# Patient Record
Sex: MALE | Race: WHITE | Employment: FULL TIME | URBAN - METROPOLITAN AREA
[De-identification: names, ages, dates, MRNs, and addresses within clinical notes are randomized per-mention and may not be internally consistent; named-entity substitution may affect disease eponyms.]

---

## 2017-05-16 ENCOUNTER — OP HISTORICAL/CONVERTED ENCOUNTER (OUTPATIENT)
Dept: OTHER | Age: 37
End: 2017-05-16

## 2018-03-02 ENCOUNTER — OP HISTORICAL/CONVERTED ENCOUNTER (OUTPATIENT)
Dept: OTHER | Age: 38
End: 2018-03-02

## 2018-04-19 ENCOUNTER — OP HISTORICAL/CONVERTED ENCOUNTER (OUTPATIENT)
Dept: OTHER | Age: 38
End: 2018-04-19

## 2019-11-22 ENCOUNTER — TELEPHONE (OUTPATIENT)
Dept: RHEUMATOLOGY | Age: 39
End: 2019-11-22

## 2019-11-22 NOTE — TELEPHONE ENCOUNTER
----- Message from Esther Gan sent at 11/22/2019 11:49 AM EST -----  Regarding: Lupe Mcgraw MD/Telephone  General Message/Vendor Calls    Caller's first and last name:  Aryan Bell     Reason for call: New Pt informed that he is currently suffering from strep throat and bronchitis, because of his condition he questioned if it will be wise to come to his upcoming appt today.        Callback required yes/no and why: Yes       Best contact number(s): [phone]      Details to clarify the request: N/A

## 2019-11-22 NOTE — TELEPHONE ENCOUNTER
Pt informed he should re-scheduled his appt due to strep throat. Pt states he was told to come in to his appt since the call center could not reach the office. Pt informed that we will call him back today with date and time of new appt.

## 2020-02-19 ENCOUNTER — HOSPITAL ENCOUNTER (OUTPATIENT)
Dept: GENERAL RADIOLOGY | Age: 40
Discharge: HOME | End: 2020-02-19
Payer: COMMERCIAL

## 2020-02-19 ENCOUNTER — OFFICE VISIT (OUTPATIENT)
Dept: RHEUMATOLOGY | Age: 40
End: 2020-02-19

## 2020-02-19 VITALS
HEIGHT: 76 IN | DIASTOLIC BLOOD PRESSURE: 84 MMHG | TEMPERATURE: 99.3 F | RESPIRATION RATE: 20 BRPM | BODY MASS INDEX: 38.36 KG/M2 | OXYGEN SATURATION: 97 % | WEIGHT: 315 LBS | HEART RATE: 81 BPM | SYSTOLIC BLOOD PRESSURE: 135 MMHG

## 2020-02-19 DIAGNOSIS — E66.01 SEVERE OBESITY (HCC): ICD-10-CM

## 2020-02-19 DIAGNOSIS — M70.52 PATELLAR BURSITIS OF LEFT KNEE: ICD-10-CM

## 2020-02-19 DIAGNOSIS — M17.0 PRIMARY OSTEOARTHRITIS OF BOTH KNEES: Primary | ICD-10-CM

## 2020-02-19 DIAGNOSIS — M17.0 PRIMARY OSTEOARTHRITIS OF BOTH KNEES: ICD-10-CM

## 2020-02-19 DIAGNOSIS — M10.9 GOUT, UNSPECIFIED CAUSE, UNSPECIFIED CHRONICITY, UNSPECIFIED SITE: ICD-10-CM

## 2020-02-19 PROCEDURE — 73562 X-RAY EXAM OF KNEE 3: CPT

## 2020-02-19 RX ORDER — INDOMETHACIN 50 MG/1
CAPSULE ORAL
COMMUNITY
Start: 2020-01-22

## 2020-02-19 RX ORDER — NAPROXEN 500 MG/1
500 TABLET ORAL 2 TIMES DAILY WITH MEALS
Qty: 60 TAB | Refills: 6 | Status: SHIPPED | OUTPATIENT
Start: 2020-02-19 | End: 2020-03-20

## 2020-02-19 RX ORDER — ALLOPURINOL 100 MG/1
TABLET ORAL
COMMUNITY
Start: 2020-01-22 | End: 2020-02-19 | Stop reason: SDUPTHER

## 2020-02-19 RX ORDER — ALLOPURINOL 100 MG/1
200 TABLET ORAL DAILY
Qty: 180 TAB | Refills: 3 | Status: SHIPPED | OUTPATIENT
Start: 2020-02-19 | End: 2020-04-30 | Stop reason: SDUPTHER

## 2020-02-19 NOTE — PATIENT INSTRUCTIONS
Please fill out your [address] you will receive after your visit in the mail or via [address]!

## 2020-02-19 NOTE — PROGRESS NOTES
REASON FOR VISIT    This is the initial evaluation for Mr. Yanni Hsieh a 37 y.o.  male for question of gout. The patient is referred to the [facility] at the request of Dr. Trinity Win. HISTORY OF PRESENT ILLNESS     Previous medical records reviewed and summarized: yes    MHAQ:0.375  Pain scale: 5/10    This is a 37 y.o. male with hx of gout on allopurinol 200 mg oral daily. The patient notes he has gout in toes, ankles, feet, knee off and on for about 10 years. He had an attack in the left knee and left knee has been painful for 2 months. It hurt to bend down, stretch, stand up. It has improved over the past 3-4 days. He thought it was gout all along. He saw a new PCP on Monday and he mentioned psoriatic arthritis since he has psoriasis. The patient notes that his big toe was red, painful, swollen. After that he had multiple episodes which were similar in the big toe. He was getting the attacks several times a year but last year he had 3-5 times. Then they happened in other joints too. He took cherry juice and took tylenol initially. Then eventually he was given colchicine. He would take colchicine when he got the attack. He would take enough to get diarrhea then he would stop. With the colchicine the attacks improved faster. Otherwise they would last for a week to 3 weeks. He had an ankle attack last year and they gave him IM steroid injection. Allopurinol was started 2 years ago but he stopped taking it. He was on 100 mg oral daily. He took it for 1 month. Then he was put on uloric which he took for 1 month but stopped it due to insurance. For the past 3-4 months he has been taking 200 mg oral daily of allopurinol consistently. For the past 2 month months he has left knee pain with running, walking. IT has swollen and it feels gout like. In the beginning it was swollen and red. The swelling was there over the past 2 months.     He has woke up in the past 3-4 months and he has woken up with pain in ankle or foot and he takes indomethacin as soon as he felt the pain. Sometimes the symptoms would go away and sometimes they would persist and eventually improve. No hx of arthrocentesis. He has had lower back pain in the past.    + psoriasis. He never drinks alcohol.  + use of sugary drinks. REVIEW OF SYSTEMS    A 15 point review of systems was performed and summarized below. The questionnaire was reviewed with the patient and scanned into the patient's medical record.     General: endorses recent weight gain 25lbs, fatigue,denies  recent weight loss,  weakness, fever, drenching night sweats  Musculoskeletal: endorses joint pain,denies joint swelling, morning stiffness, muscle pain  Ears: denies ringing in ears, hearing loss, deafness  Eyes:denies pain, light sensitive, redness, blindness, double vision, blurred vision, excess tearing, dryness, foreign body sensation  Mouth: endorses dryness, denies sore tongue, oral ulcers, loss of taste, increased dental caries  Nose: denies nosebleeds, nasal ulcers  Throat: denies food stuck when swallowing, difficulty with swallowing, hoarseness, pain in jaw while chewing  Neck: denies swollen glands, tender glands  Cardiopulmonary: denies pain in chest with deep breaths, pain in chest when lying down, murmurs, sudden changes in heart beat, wheezing, dry cough, productive cough, shortness of breath at rest, shortness of breath on exertion, coughing of blood  Gastrointestinal: denies nausea, heartburn, stomach pain relieved by food, chronic constipation, chronic diarrhea, blood in stools, black stools  Genitourinary: denies vaginal dryness, pain or burning on urination, blood in urine, cloudy urine, vaginal ulcers, penile ulcers  Hematologic: denies anemia, bleeding tendency, blood clots, bleeding gums  Skin:denies easy bruising, hair loss, rash, rash worsened after sun exposure, hives/urticaria, skin thickening, skin tightness, nodules/bumps, color changes of hands or feet in the cold (Raynaud's)  Neurologic: denies numbness or tingling in hands, numbness or tingling in feet, muscle weakness  Psychiatric: denies depression, excessive worries, PTSD, Bipolar  Sleep: denies poor sleep (6-7 hours), denies snoring, apnea, daytime somnolence, difficulty falling asleep, difficulty staying asleep     PAST MEDICAL HISTORY    Past Medical History:   Diagnosis Date    GERD (gastroesophageal reflux disease)     Gout         History reviewed. No pertinent surgical history. FAMILY HISTORY    History reviewed. No pertinent family history. SOCIAL HISTORY    Social History     Tobacco Use    Smoking status: Never Smoker    Smokeless tobacco: Never Used   Substance Use Topics    Alcohol use: Yes    Drug use: Never       MEDICATIONS        ALLERGIES    Allergies   Allergen Reactions    Sulfa (Sulfonamide Antibiotics) Unknown (comments)       PHYSICAL EXAMINATION    Visit Vitals  /84 (BP 1 Location: Left arm, BP Patient Position: Sitting)   Pulse 81   Temp 99.3 °F (37.4 °C) (Oral)   Resp 20   Ht 6' 4\" (1.93 m)   Wt 323 lb (146.5 kg)   SpO2 97%   BMI 39.32 kg/m²     Body mass index is 39.32 kg/m². General: NAD  HEENT: PERRL, anicteric, non-injected sclerae; oropharynx without ulcers, erythema, or exudate. Moist mucous membranes. Lymphatic: No cervical or axillary lymphadenopathy. Cardiovascular: S1, S2,no R/M/G  Pulmonary: CTA b/l. No wheezes/rales/rhonchi. Abdominal: Soft,NTND, + BS. Skin: No rash, nodules, or periungual changes.   Neuro: Alert; able to carry normal conversation    Musculoskeletal:   Left knee with mild warmth,  Mild swelling of the pre-patellar bursa with some TTP and warmth    DATA REVIEW    Prior medical records were reviewed and if applicable are summarized as below:    Labs:   2/2020: uric acid 5.6, ESR 25, CRP, TSH, CMP, cbc normal, AWAIS negative    Imaging:   N/A    ASSESSMENT AND PLAN    A 37 y.o. male with hx of non-crystal proven gout on allopurinol 200 mg oral daily presents for evaluation of left knee pain. Based on patient's history he likely has gout. His history is consistent with podagra. His current knee swelling is due to pre-patellar bursitis. # Gout:  - continue allopurinol 200 mg oral daily    # Pre-patellar bursitis:  - naproxen 500 mg BID for 10 days. Advised to take with food  - advised to use a knee brace    # Knee osteoarthritis:  - x-rays ordered today     RTC in 2 months    The patient voiced understanding of the aforementioned assessment and plan. Summary of plan was provided in the After Visit Summary patient instructions. I also provided education about MyChart setup and utility. Mr. Spike Spears has a reminder for a \"due or due soon\" health maintenance. I have asked that he contact his primary care provider for follow-up on this health maintenance.     TODAY'S ORDERS    Orders Placed This Encounter    DISCONTD: allopurinoL (ZYLOPRIM) 100 mg tablet    indomethacin (INDOCIN) 50 mg capsule    naproxen (NAPROSYN) 500 mg tablet    allopurinoL (ZYLOPRIM) 100 mg tablet       Future Appointments   Date Time Provider Department Center   4/22/2020  2:40 PM MD Galina White MD    Adult Rheumatology   [facility]  A Part of [facility], [address] [place] [address], [address] [place] [address]   Phone [phone]  Fax [phone]

## 2020-04-30 ENCOUNTER — VIRTUAL VISIT (OUTPATIENT)
Dept: RHEUMATOLOGY | Age: 40
End: 2020-04-30

## 2020-04-30 DIAGNOSIS — M17.0 PRIMARY OSTEOARTHRITIS OF BOTH KNEES: Primary | ICD-10-CM

## 2020-04-30 DIAGNOSIS — E66.01 SEVERE OBESITY (HCC): ICD-10-CM

## 2020-04-30 DIAGNOSIS — M10.9 GOUT, UNSPECIFIED CAUSE, UNSPECIFIED CHRONICITY, UNSPECIFIED SITE: ICD-10-CM

## 2020-04-30 RX ORDER — ALLOPURINOL 100 MG/1
200 TABLET ORAL DAILY
Qty: 180 TAB | Refills: 1 | Status: SHIPPED | OUTPATIENT
Start: 2020-04-30 | End: 2020-12-07 | Stop reason: SDUPTHER

## 2020-04-30 NOTE — PROGRESS NOTES
REASON FOR VISIT    Radha Barraza is a 37 y.o. male who was seen by synchronous (real-time) audio-video technology on 4/30/2020. HISTORY OF PRESENT ILLNESS     Previous medical records reviewed and summarized: yes    The patient notes he is doing well but still working since he is a . He has had some knee pain from prolonged sitting from driving. He has not had a full blown gout attack since last visit but he has felt some mild pain in joints. He is very happy as before he used to get a lot of flares. He notes his knee pain is very mild and he takes nothing for it.  No swelling or trauma    REVIEW OF SYSTEMS    Positives as per history  Negatives as follows:  CONSTITUTlONAL:    Denies unexplained persistent fevers, weight change, chronic fatigue  HEAD/EYES:              Denies eye redness, blurry vision or sudden loss of vision, dry eyes, HA, temporal artery pain  ENT:                            Denies oral/nasal ulcers, recurrent sinus infections, dry mouth  RESPIRATORY:         No pleuritic pain, history of pleural effusions, hemoptysis, exertional dyspnea  CARDIOVASCULAR:             Denies chest pain, history of pericardial effusions  GASTRO:                    Denies heartburn, esophageal dysmotility, abdominal pain, nausea, vomiting, diarrhea, blood in the stool  HEMATOLOGIC:        No easy bruising, purpura, swollen lymph nodes  SKIN:                           Denies alopecia, ulcers, nodules, sun sensitivity, unexplained persistent rash   VASCULAR:                Denies edema, cyanosis, raynaud phenomenon  NEUROLOGIC:           Denies specific muscle weakness, paresthesias   PSYCHIATRIC:           No sleep disturbance / snoring, depression, anxiety  MSK:                           No morning stiffness >1 hour, SI joint pain, persistent joint swelling, persistent joint pain    PAST MEDICAL HISTORY    Past Medical History:   Diagnosis Date    GERD (gastroesophageal reflux disease)     Gout         No past surgical history on file. FAMILY HISTORY    No family history on file. SOCIAL HISTORY    Social History     Tobacco Use    Smoking status: Never Smoker    Smokeless tobacco: Never Used   Substance Use Topics    Alcohol use: Yes    Drug use: Never       MEDICATIONS      Current Outpatient Medications:     indomethacin (INDOCIN) 50 mg capsule, TK 1 C PO TID FOR 14 DAYS PRF PAIN, Disp: , Rfl:     allopurinoL (ZYLOPRIM) 100 mg tablet, Take 2 Tabs by mouth daily. , Disp: 180 Tab, Rfl: 3      ALLERGIES    Allergies   Allergen Reactions    Sulfa (Sulfonamide Antibiotics) Unknown (comments)       PHYSICAL EXAMINATION    General: NAD, looks well, normal respiratory effort  HEENT: PERRL, anicteric, non-injected sclerae; lids without inflammation  Pulmonary: Normal respirations, no retractions, coughing  Skin: grossly normal via video call  Neuro: Alert; able to carry normal conversation, cognition, affect normal    Musculoskeletal:   Grossly normal looking hands    Due to this being a TeleHealth evaluation, many elements of the physical examination are unable to be assessed. DATA REVIEW    Prior medical records were reviewed and if applicable are summarized as below:    Labs:   2/2020: uric acid 5.6, ESR 25, CRP, TSH, CMP, cbc normal, AWAIS negative    Imaging:   N/A    ASSESSMENT AND PLAN    A 37 y.o. male with hx of non-crystal proven gout on allopurinol 200 mg oral daily presents for a follow up visit. The patient is doing well on current regimen. We discussed increasing allopurinol to 300 mg oral daily since he thinks he is getting mild flares but given that this is the best he has done in 2 years and he is busy with work right now, he does not want to change medication regimen and risk precipitating gout flares.       # Gout:  - continue allopurinol 200 mg oral daily  - can increase to 300 mg in the future    # Pre-patellar bursitis:  - resolved    # Knee osteoarthritis:  - advised use to heat  - voltaren gel    RTC in 4 months    The patient voiced understanding of the aforementioned assessment and plan. Summary of plan was provided in the After Visit Summary patient instructions. I also provided education about MyChart setup and utility. Mr. Myra Claude has a reminder for a \"due or due soon\" health maintenance. I have asked that he contact his primary care provider for follow-up on this health maintenance. TODAY'S ORDERS    No orders of the defined types were placed in this encounter. No future appointments.     Zina Kehr, MD    Adult Rheumatology   [facility]  A Part of [facility], [address] [facility], [address] [facility]   Phone [phone]  Fax [phone]

## 2020-04-30 NOTE — PATIENT INSTRUCTIONS
Please fill out your [address] you will receive after your visit in the mail or via [address]

## 2020-12-07 ENCOUNTER — TRANSCRIBE ORDER (OUTPATIENT)
Dept: RHEUMATOLOGY | Age: 40
End: 2020-12-07

## 2020-12-07 RX ORDER — ALLOPURINOL 100 MG/1
200 TABLET ORAL DAILY
Qty: 60 TAB | Refills: 0 | Status: SHIPPED | OUTPATIENT
Start: 2020-12-07

## 2020-12-07 NOTE — TELEPHONE ENCOUNTER
----- Message from Ezra Aguirre RN sent at 12/7/2020 10:43 AM EST -----  Regarding: FW: JORJE/TELEPHONE  Contact: [phone]    ----- Message -----  From: Amilcar Cohen  Sent: 12/7/2020  10:26 AM EST  To: [facility] Nurse Pool  Subject: JORJE/TELEPHONE                             Medication Refill    Caller (if not patient): N/A      Relationship of caller (if not patient): N/A      Best contact number(s): [phone]      Name of medication and dosage if known: Allopurinol      Is patient out of this medication (yes/no): A 3 days      Pharmacy name: [facility]    Pharmacy listed in chart? (yes/no): No    Pharmacy phone number: [phone]    Details to clarify the request: Refill request for Allopurinol.         Balbina Talley

## 2021-07-19 ENCOUNTER — TRANSCRIBE ORDER (OUTPATIENT)
Dept: SCHEDULING | Age: 41
End: 2021-07-19

## 2021-07-19 DIAGNOSIS — M25.512 ACUTE PAIN OF LEFT SHOULDER: Primary | ICD-10-CM

## 2021-07-19 DIAGNOSIS — S46.012A TRAUMATIC TEAR OF LEFT ROTATOR CUFF: ICD-10-CM

## 2021-07-27 ENCOUNTER — HOSPITAL ENCOUNTER (OUTPATIENT)
Dept: MRI IMAGING | Age: 41
Discharge: HOME | End: 2021-07-27
Attending: PHYSICIAN ASSISTANT
Payer: COMMERCIAL

## 2021-07-27 DIAGNOSIS — S46.012A TRAUMATIC TEAR OF LEFT ROTATOR CUFF: ICD-10-CM

## 2021-07-27 DIAGNOSIS — M25.512 ACUTE PAIN OF LEFT SHOULDER: ICD-10-CM

## 2021-07-27 PROCEDURE — 73221 MRI JOINT UPR EXTREM W/O DYE: CPT

## 2022-03-20 PROBLEM — E66.01 SEVERE OBESITY (HCC): Status: ACTIVE | Noted: 2020-02-19

## 2023-05-17 RX ORDER — INDOMETHACIN 50 MG/1
CAPSULE ORAL
COMMUNITY
Start: 2020-01-22

## 2023-05-17 RX ORDER — ALLOPURINOL 100 MG/1
200 TABLET ORAL DAILY
COMMUNITY
Start: 2020-12-07